# Patient Record
Sex: MALE | Race: OTHER
[De-identification: names, ages, dates, MRNs, and addresses within clinical notes are randomized per-mention and may not be internally consistent; named-entity substitution may affect disease eponyms.]

---

## 2020-03-16 ENCOUNTER — HOSPITAL ENCOUNTER (EMERGENCY)
Dept: HOSPITAL 25 - UCCORT | Age: 35
Discharge: HOME | End: 2020-03-16
Payer: COMMERCIAL

## 2020-03-16 VITALS — SYSTOLIC BLOOD PRESSURE: 154 MMHG | DIASTOLIC BLOOD PRESSURE: 87 MMHG

## 2020-03-16 DIAGNOSIS — J45.909: ICD-10-CM

## 2020-03-16 DIAGNOSIS — B34.9: Primary | ICD-10-CM

## 2020-03-16 PROCEDURE — G0463 HOSPITAL OUTPT CLINIC VISIT: HCPCS

## 2020-03-16 PROCEDURE — 99212 OFFICE O/P EST SF 10 MIN: CPT

## 2020-03-16 NOTE — UC
Respiratory Complaint HPI





- HPI Summary


HPI Summary: 





Pt presents with c/o nasal congestion, ST,  cough, sneezing, body aches X 4 

days. Pt's work supervisor heard pt coughing and sneezing and requested that he 

be seen for concern of COVID 19.  Symptoms began prior to visiting [place] for the 

weekend.  Pt was recently visiting [facility] an dis concerned for COVID 19.   





- History of Current Complaint


Chief Complaint: UCGeneralIllness


Stated Complaint: COUGH, SINUS COMPLAINT


Time Seen by Provider: 03/16/20 19:55


Hx Obtained From: Patient


Onset/Duration: Sudden Onset, Lasting Days, Still Present


Timing: Intermittent Episodes


Severity Initially: Mild


Severity Currently: Mild


Pain Intensity: 0


Character: Cough: Nonproductive


Aggravating Factors: Deep Breaths, Recumbent Position


Associated Signs And Symptoms: Positive: URI, Nasal Congestion





- Risk Factors


Pulmonary Embolism Risk Factors: Recent Travel


Cardiac Risk Factors: Negative


Pseudomonas Risk Factors: Negative


Tuberculosis Risk Factors: Negative





- Allergies/Home Medications


Allergies/Adverse Reactions: 


 Allergies











Allergy/AdvReac Type Severity Reaction Status Date / Time


 


No Known Allergies Allergy   Verified 03/16/20 19:46











Home Medications: 


 Home Medications





Fexofenadine/Pseudoephedrine [Allegra-D 24 Hour Tablet] 1 each PO DAILY #7 

tab.er.24h 03/16/20 [Rx]


Ibuprofen TAB* [Advil TAB*] 200 mg PO Q6H PRN 03/16/20 [History Confirmed 03/16/ 20]


Oxymetazoline 0.05% NASAL SPR* [Afrin 0.05% NASAL SPRAY*] 1 spray NASAL Q12H 4 

Days #1 btl 03/16/20 [Rx]











PMH/Surg Hx/FS Hx/Imm Hx


Previously Healthy: Yes


Respiratory History: Asthma - as a child





- Surgical History


Surgical History: Yes


Surgery Procedure, Year, and Place: Cholecystectomy





- Family History


Known Family History: Positive: Cardiac Disease





- Social History


Occupation: Employed Full-time


Lives: With Family


Alcohol Use: None


Substance Use Type: None


Smoking Status (MU): Never Smoked Tobacco


Have You Smoked in the Last Year: No





- Immunization History


Vaccination Up to Date: No





Review of Systems


All Other Systems Reviewed And Are Negative: Yes


Constitutional: Positive: Fatigue


Skin: Positive: Negative


Eyes: Positive: Negative


ENT: Positive: Sinus Congestion


Respiratory: Positive: Cough


Cardiovascular: Positive: Negative


Gastrointestinal: Positive: Negative


Genitourinary: Positive: Negative


Motor: Positive: Negative


Neurovascular: Positive: Negative


Musculoskeletal: Positive: Myalgia


Neurological/Mental Status: Positive: Negative


Psychological: Positive: Negative


Is Patient Immunocompromised?: No





Physical Exam


Triage Information Reviewed: Yes


Appearance: Well-Appearing


Vital Signs: 


 Initial Vital Signs











Temp  98.1 F   03/16/20 19:51


 


Pulse  72   03/16/20 19:51


 


Resp  20   03/16/20 19:51


 


BP  154/87   03/16/20 19:51


 


Pulse Ox  99   03/16/20 19:51











Vital Signs Reviewed: Yes


Eye Exam: Normal


ENT: Positive: Nasal congestion, Tonsillar swelling


Dental Exam: Normal


Neck exam: Normal


Respiratory: Positive: Normal breath sounds, No respiratory distress


Cardiovascular Exam: Normal


Musculoskeletal Exam: Normal


Neurological Exam: Normal


Psychological Exam: Normal


Skin Exam: Normal





Respiratory Course/Dx





- Course


Course Of Treatment: 





Pt did not have fever, SOB, cough, or adventitious or  decreased respiratory 

sounds





- Differential Dx/Diagnosis


Differential Diagnosis/HQI/PQRI: Influenza, Other - COVID 19


Provider Diagnosis: 


 Viral syndrome








Discharge ED





- Sign-Out/Discharge


Documenting (check all that apply): Patient Departure


All imaging exams completed and their final reports reviewed: No Studies





- Discharge Plan


Condition: Stable


Disposition: HOME


Prescriptions: 


Fexofenadine/Pseudoephedrine [Allegra-D 24 Hour Tablet] 1 each PO DAILY #7 

tab.er.24h


Oxymetazoline 0.05% NASAL SPR* [Afrin 0.05% NASAL SPRAY*] 1 spray NASAL Q12H 4 

Days #1 btl


Patient Education Materials:  Viral Syndrome (ED)


Forms:  *Gen. Provider Communication


Referrals: 


Jessy Foster PA [Primary Care Provider] - If Needed





- Billing Disposition and Condition


Condition: STABLE


Disposition: Home





- Attestation Statements


Provider Attestation: 





 I was available for consultation for this patient. I did not evaluate the 

patient or participate in any medical decision making or disposition decisions 

unless I am specifically named in the chart as having consulted on the patient. 

If I have consulted on the patient, please see my own ED note on the patient 

encounter. Shanon Moreno MD